# Patient Record
Sex: FEMALE | Race: BLACK OR AFRICAN AMERICAN | Employment: UNEMPLOYED | URBAN - METROPOLITAN AREA
[De-identification: names, ages, dates, MRNs, and addresses within clinical notes are randomized per-mention and may not be internally consistent; named-entity substitution may affect disease eponyms.]

---

## 2017-08-31 ENCOUNTER — HOSPITAL ENCOUNTER (EMERGENCY)
Age: 22
Discharge: HOME | End: 2017-08-31
Attending: INTERNAL MEDICINE
Payer: COMMERCIAL

## 2017-08-31 VITALS
DIASTOLIC BLOOD PRESSURE: 85 MMHG | RESPIRATION RATE: 16 BRPM | OXYGEN SATURATION: 98 % | HEIGHT: 60 IN | WEIGHT: 115 LBS | SYSTOLIC BLOOD PRESSURE: 149 MMHG | BODY MASS INDEX: 22.58 KG/M2 | TEMPERATURE: 98.9 F | HEART RATE: 102 BPM

## 2017-08-31 DIAGNOSIS — R10.9 ABDOMINAL PAIN, OTHER SPECIFIED SITE: Primary | ICD-10-CM

## 2017-08-31 LAB
APPEARANCE UR: CLEAR
BACTERIA URNS QL MICRO: NEGATIVE /HPF
BILIRUB UR QL: NEGATIVE
CLUE CELLS VAG QL WET PREP: NORMAL
COLOR UR: ABNORMAL
EPITH CASTS URNS QL MICRO: ABNORMAL /LPF
GLUCOSE UR STRIP.AUTO-MCNC: NEGATIVE MG/DL
HGB UR QL STRIP: NEGATIVE
KETONES UR QL STRIP.AUTO: NEGATIVE MG/DL
KOH PREP SPEC: NORMAL
LEUKOCYTE ESTERASE UR QL STRIP.AUTO: NEGATIVE
MUCOUS THREADS URNS QL MICRO: ABNORMAL /LPF
NITRITE UR QL STRIP.AUTO: NEGATIVE
PH UR STRIP: 7.5 [PH] (ref 5–8)
PROT UR STRIP-MCNC: NEGATIVE MG/DL
RBC #/AREA URNS HPF: ABNORMAL /HPF (ref 0–5)
SERVICE CMNT-IMP: NORMAL
SP GR UR REFRACTOMETRY: 1.02 (ref 1–1.03)
T VAGINALIS VAG QL WET PREP: NORMAL
UROBILINOGEN UR QL STRIP.AUTO: 1 EU/DL (ref 0.2–1)
WBC URNS QL MICRO: ABNORMAL /HPF (ref 0–4)

## 2017-08-31 PROCEDURE — 87210 SMEAR WET MOUNT SALINE/INK: CPT | Performed by: INTERNAL MEDICINE

## 2017-08-31 PROCEDURE — 87491 CHLMYD TRACH DNA AMP PROBE: CPT | Performed by: INTERNAL MEDICINE

## 2017-08-31 PROCEDURE — 99283 EMERGENCY DEPT VISIT LOW MDM: CPT

## 2017-08-31 PROCEDURE — 81001 URINALYSIS AUTO W/SCOPE: CPT | Performed by: INTERNAL MEDICINE
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
## 2017-09-01 LAB
C TRACH DNA SPEC QL NAA+PROBE: NEGATIVE
N GONORRHOEA DNA SPEC QL NAA+PROBE: NEGATIVE
SAMPLE TYPE: NORMAL
SERVICE CMNT-IMP: NORMAL
SPECIMEN SOURCE: NORMAL

## 2017-09-01 NOTE — ED NOTES
RN went into room to administer Tdap and to provide wound care. Patient was not in room. Notified provider Stew Cuadra MD. Provider verbalized understanding of this.

## 2017-09-01 NOTE — ED NOTES
Patient wanted to know what results of pregnancy test was. Informed patient that one line is indicative of a negative pregnancy. Patient verbalized understanding of information.

## 2017-09-01 NOTE — ED NOTES
Emergency Department Nursing Plan of Care       The Nursing Plan of Care is developed from the Nursing assessment and Emergency Department Attending provider initial evaluation. The plan of care may be reviewed in the ED Provider note.     The Plan of Care was developed with the following considerations:   Patient / Family readiness to learn indicated by:verbalized understanding  Persons(s) to be included in education: patient  Barriers to Learning/Limitations:No    [address], RN    8/31/2017   7:45 PM    See Assessment

## 2017-09-01 NOTE — ED NOTES
Patient wants to know how much longer will it be before the doctor sees her again. Reports that she \"has to go to work. \" Notified provider Tom Haywood. Provider verbalized understanding of information.

## 2017-12-12 ENCOUNTER — OFFICE VISIT (OUTPATIENT)
Dept: BEHAVIORAL/MENTAL HEALTH CLINIC | Age: 22
End: 2017-12-12

## 2017-12-12 VITALS
BODY MASS INDEX: 24.74 KG/M2 | WEIGHT: 126 LBS | OXYGEN SATURATION: 98 % | HEART RATE: 92 BPM | SYSTOLIC BLOOD PRESSURE: 102 MMHG | HEIGHT: 60 IN | DIASTOLIC BLOOD PRESSURE: 67 MMHG

## 2017-12-12 DIAGNOSIS — F41.9 ANXIETY: ICD-10-CM

## 2017-12-12 DIAGNOSIS — F60.3 BORDERLINE PERSONALITY DISORDER (HCC): Primary | ICD-10-CM

## 2017-12-12 DIAGNOSIS — F40.10 SOCIAL ANXIETY DISORDER: ICD-10-CM

## 2017-12-12 DIAGNOSIS — R45.4 EXCESSIVE ANGER: ICD-10-CM

## 2017-12-12 RX ORDER — HYDROXYZINE 25 MG/1
25 TABLET, FILM COATED ORAL
Qty: 90 TAB | Refills: 0 | Status: SHIPPED | OUTPATIENT
Start: 2017-12-12 | End: 2018-08-14

## 2017-12-12 RX ORDER — DIVALPROEX SODIUM 250 MG/1
250 TABLET, DELAYED RELEASE ORAL 2 TIMES DAILY
Qty: 60 TAB | Refills: 0 | Status: SHIPPED | OUTPATIENT
Start: 2017-12-12
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
## 2017-12-12 NOTE — PATIENT INSTRUCTIONS
Sleep Tips    What to avoid    · Do not have drinks with caffeine, such as coffee or black tea, for 8 hours before bed. · Do not smoke or use other types of tobacco near bedtime. Nicotine is a stimulant and can keep you awake. · Avoid drinking alcohol late in the evening, because it can cause you to wake in the middle of the night. · Do not eat a big meal close to bedtime. If you are hungry, eat a light snack. · Do not drink a lot of water close to bedtime, because the need to urinate may wake you up during the night. · Do not read or watch TV in bed. Use the bed only for sleeping and sexual activity. What to try    · Go to bed at the same time every night, and wake up at the same time every morning. Do not take naps during the day. · Keep your bedroom quiet, dark, and cool. · Get regular exercise, but not within 3 to 4 hours of your bedtime. · Sleep on a comfortable pillow and mattress. · If watching the clock makes you anxious, turn it facing away from you so you cannot see the time. · If you worry when you lie down, start a worry book. Well before bedtime, write down your worries, and then set the book and your concerns aside. · Try meditation or other relaxation techniques before you go to bed. · If you cannot fall asleep, get up and go to another room until you feel sleepy. Do something relaxing. Repeat your bedtime routine before you go to bed again. Make your house quiet and calm about an hour before bedtime. Turn down the lights, turn off the TV, log off the computer, and turn down the volume on music. This can help you relax after a busy day. Learning About Borderline Personality Disorder  What is borderline personality disorder? Borderline personality disorder is a mental health condition. It causes intense mood swings, impulsive behaviors, and severe problems with self-worth. It can lead to troubled relationships in every area of your life.   Experts don't know exactly what causes the disorder. It may be linked to a problem with the parts of the brain that control reactions to emotions. The disorder also seems to run in families. Often, people who get it faced some kind of childhood trauma such as abuse, neglect, or the death of a parent. Most of the time, signs of the disorder first appear in childhood. But problems often don't start until the early adult years. It's important to know that the disorder can be treated. Treatment can be hard, and getting better can take years. But with treatment, most people with borderline personality disorder do get better over time. What are the symptoms? Everyone has problems with emotions or behaviors sometimes. But if you have borderline personality disorder, the problems are severe. They repeat over a long time and disrupt your life. The most common symptoms include:  Intense emotions and mood swings. Harmful, impulsive behaviors. These may include substance abuse, binge eating, out-of-control spending, risky sexual behavior, and reckless driving. Hurting yourself. This may include cutting or burning yourself or attempting suicide. Trouble with relationships. You may see others as either \"good\" or \"bad. \" And your view may suddenly shift from one to the other, for minor reasons. Feeling worthless or empty inside. A frantic fear of being left alone (abandoned). This fear may lead to desperate attempts to hold on to those around you. Or it may cause you to reject others before they can reject you. Problems with anger. These may include violent temper tantrums and aggressive behavior. How is it treated? It may seem that there is no one on your side. You might have been told that there is no hope. But just because you've heard this, that doesn't mean it's true. Getting medical treatment and taking care of yourself can really help. Medical treatment  When you start treatment, you will find health professionals who will help you.  You will also meet others who have gone through what you are going through. Long-term treatment can reduce symptoms and harmful behaviors. It can also help you manage your emotions better. Treatment may include:  Counseling and therapy. It's important to find a counselor you can build a stable relationship with. This can be hard. Your condition may cause you to see your counselor as caring one minute and cruel the next. This can happen especially when he or she asks you to try to change a behavior. Try to find a counselor who has special training in dialectical behavioral therapy. It's a type of therapy that is often used to treat people with this disorder. Medicines. Examples are antidepressants, mood stabilizers, and antipsychotics. They may be helpful when you use them along with therapy. Self-care  There are things you can do to help yourself. Here are some tips:  Keep a regular daily schedule. Do not drink alcohol or use drugs. If your doctor prescribed medicines for you, take them exactly as directed. Get a healthy amount of sleep. Try to be active during daylight hours, and go to sleep and wake up at the same time each day. Eat healthy foods like fruits and vegetables; whole-grain breads and cereals; and lean meats, fish, and poultry. Practice mindfulness or other meditation. To be mindful means to pay attention to and accept the things that are happening right now, in the present moment. Keep to your treatment plan, even when it's hard. Keep the numbers for these national suicide hotlines: 6-462-922-TALK (8-910.548.5136) and 3-659-FTQYVTV (0-292.518.2218). If you or someone you know talks about suicide or about feeling hopeless, get help right away. When should you call for help? Call 911 anytime you think you may need emergency care. For example, call if:  You feel you cannot stop from hurting yourself or someone else. Call your doctor now or seek immediate medical care if:  You feel much more depressed.   You hear voices. Watch closely for changes in your health, and be sure to contact your doctor if:  You have a new crisis you can't handle. Follow-up care is a key part of your treatment and safety. Be sure to make and go to all appointments, and call your doctor if you are having problems. It's also a good idea to know your test results and keep a list of the medicines you take. Where can you learn more? Go to http://ruby-duane.info/. Enter B255 in the search box to learn more about \"Learning About Borderline Personality Disorder. \"  Current as of: May 12, 2017  Content Version: 11.4  © 3912-3043 [facility]. Care instructions adapted under license by [employer] (which disclaims liability or warranty for this information). If you have questions about a medical condition or this instruction, always ask your Dialectical Behavior Therapy (DBT)    [facility]  Jaz Chris, [facility]  [phone]  [email or web address]    Therapist:  Andrey Sheppard      [facility]  (Dr Lalita Diaz, Dr Alverto Daniel)  [address]. [employer] disclaims any warranty or liability for your use of this information.   ·

## 2017-12-12 NOTE — MR AVS SNAPSHOT
[address] [place] [address] 
[phone] [facility] MRN: [id number] :1995 Visit Information Date & Time Provider Department Dept. Phone Encounter #  
 2017  3:00 PM Ivon Araujo [facility] [phone] [id number] Follow-up Instructions Return in about 4 weeks (around 2018) for med check and follow up. Follow-up and Disposition History Upcoming Health Maintenance Date Due Hepatitis A Peds Age 1-18 (1 of 2 - Standard Series) 1998 DTaP/Tdap/Td series (1 - Tdap) 2004 HPV Age 9Y-34Y (1 of 3 - Female 3 Dose Series) 2008 Influenza Age 5 to Adult 2018 Allergies as of 2017  Review Complete On: 2017 By: Ivon Araujo NP No Known Allergies Current Immunizations  Never Reviewed No immunizations on file. Not reviewed this visit You Were Diagnosed With   
  
 Codes Comments Borderline personality disorder    -  Primary ICD-10-CM: F60.3 ICD-9-CM: 420.91 Anxiety     ICD-10-CM: F41.9 ICD-9-CM: 300.00 Excessive anger     ICD-10-CM: R45.4 ICD-9-CM: 312.00 Social anxiety disorder     ICD-10-CM: F40.10 ICD-9-CM: 300.23 Vitals BP Pulse Height(growth percentile) Weight(growth percentile) 102/67 (38 %/ 71 %)* (BP 1 Location: Left arm, BP Patient Position: Sitting) 92 5' (1.524 m) (5 %, Z= -1.68) 126 lb (57.2 kg) (46 %, Z= -0.11) SpO2 BMI OB Status Smoking Status 98% 24.61 kg/m2 (76 %, Z= 0.71) Having regular periods Unknown If Ever Smoked *BP percentiles are based on NHBPEP's 4th Report Growth percentiles are based on CDC 2-20 Years data. Vitals History BMI and BSA Data Body Mass Index Body Surface Area  
 24.61 kg/m 2 1.56 m 2 Preferred Pharmacy Pharmacy Name Phone  [phone] [facility]. [phone] Your Updated Medication List  
  
   
This list is accurate as of 12/12/17 11:59 PM.  Always use your most recent med list.  
  
  
  
  
 divalproex  mg tablet Commonly known as:  DEPAKOTE Take 1 Tab by mouth two (2) times a day.  
  
 hydrOXYzine HCl 25 mg tablet Commonly known as:  ATARAX Take 1 Tab by mouth three (3) times daily as needed for Anxiety. Prescriptions Sent to Pharmacy Refills  
 divalproex DR (DEPAKOTE) 250 mg tablet 0 Sig: Take 1 Tab by mouth two (2) times a day. Class: Normal  
 Pharmacy: [address] [place] [address] #: [phone] Route: Oral  
 hydrOXYzine HCl (ATARAX) 25 mg tablet 0 Sig: Take 1 Tab by mouth three (3) times daily as needed for Anxiety. Class: Normal  
 Pharmacy: [address] [place] [address] #: [phone] Route: Oral  
  
Follow-up Instructions Return in about 4 weeks (around 1/9/2018) for med check and follow up. To-Do List   
 12/12/2017 Lab:  VITAMIN D, 25 HYDROXY Patient Instructions Sleep Tips What to avoid   
· Do not have drinks with caffeine, such as coffee or black tea, for 8 hours before bed. · Do not smoke or use other types of tobacco near bedtime. Nicotine is a stimulant and can keep you awake. · Avoid drinking alcohol late in the evening, because it can cause you to wake in the middle of the night. · Do not eat a big meal close to bedtime. If you are hungry, eat a light snack. · Do not drink a lot of water close to bedtime, because the need to urinate may wake you up during the night. · Do not read or watch TV in bed. Use the bed only for sleeping and sexual activity. What to try   
· Go to bed at the same time every night, and wake up at the same time every morning. Do not take naps during the day. · Keep your bedroom quiet, dark, and cool. · Get regular exercise, but not within 3 to 4 hours of your bedtime. · Sleep on a comfortable pillow and mattress. · If watching the clock makes you anxious, turn it facing away from you so you cannot see the time. · If you worry when you lie down, start a worry book. Well before bedtime, write down your worries, and then set the book and your concerns aside. · Try meditation or other relaxation techniques before you go to bed. · If you cannot fall asleep, get up and go to another room until you feel sleepy. Do something relaxing. Repeat your bedtime routine before you go to bed again. Make your house quiet and calm about an hour before bedtime. Turn down the lights, turn off the TV, log off the computer, and turn down the volume on music. This can help you relax after a busy day. Learning About Borderline Personality Disorder What is borderline personality disorder? Borderline personality disorder is a mental health condition. It causes intense mood swings, impulsive behaviors, and severe problems with self-worth. It can lead to troubled relationships in every area of your life. Experts don't know exactly what causes the disorder. It may be linked to a problem with the parts of the brain that control reactions to emotions. The disorder also seems to run in families. Often, people who get it faced some kind of childhood trauma such as abuse, neglect, or the death of a parent. Most of the time, signs of the disorder first appear in childhood. But problems often don't start until the early adult years. It's important to know that the disorder can be treated. Treatment can be hard, and getting better can take years. But with treatment, most people with borderline personality disorder do get better over time. What are the symptoms? Everyone has problems with emotions or behaviors sometimes. But if you have borderline personality disorder, the problems are severe.  They repeat over a long time and disrupt your life. The most common symptoms include: 
Intense emotions and mood swings. Harmful, impulsive behaviors. These may include substance abuse, binge eating, out-of-control spending, risky sexual behavior, and reckless driving. Hurting yourself. This may include cutting or burning yourself or attempting suicide. Trouble with relationships. You may see others as either \"good\" or \"bad. \" And your view may suddenly shift from one to the other, for minor reasons. Feeling worthless or empty inside. A frantic fear of being left alone (abandoned). This fear may lead to desperate attempts to hold on to those around you. Or it may cause you to reject others before they can reject you. Problems with anger. These may include violent temper tantrums and aggressive behavior. How is it treated? It may seem that there is no one on your side. You might have been told that there is no hope. But just because you've heard this, that doesn't mean it's true. Getting medical treatment and taking care of yourself can really help. Medical treatment When you start treatment, you will find health professionals who will help you. You will also meet others who have gone through what you are going through. Long-term treatment can reduce symptoms and harmful behaviors. It can also help you manage your emotions better. Treatment may include: 
Counseling and therapy. It's important to find a counselor you can build a stable relationship with. This can be hard. Your condition may cause you to see your counselor as caring one minute and cruel the next. This can happen especially when he or she asks you to try to change a behavior. Try to find a counselor who has special training in dialectical behavioral therapy. It's a type of therapy that is often used to treat people with this disorder. Medicines.  Examples are antidepressants, mood stabilizers, and antipsychotics. They may be helpful when you use them along with therapy. Self-care There are things you can do to help yourself. Here are some tips: 
Keep a regular daily schedule. Do not drink alcohol or use drugs. If your doctor prescribed medicines for you, take them exactly as directed. Get a healthy amount of sleep. Try to be active during daylight hours, and go to sleep and wake up at the same time each day. Eat healthy foods like fruits and vegetables; whole-grain breads and cereals; and lean meats, fish, and poultry. Practice mindfulness or other meditation. To be mindful means to pay attention to and accept the things that are happening right now, in the present moment. Keep to your treatment plan, even when it's hard. Keep the numbers for these national suicide hotlines: 1-844-720-TALK (9-459.438.8492) and 4-760-EMLLWKN (7-174.298.1794). If you or someone you know talks about suicide or about feeling hopeless, get help right away. When should you call for help? Call 911 anytime you think you may need emergency care. For example, call if: You feel you cannot stop from hurting yourself or someone else. Call your doctor now or seek immediate medical care if: You feel much more depressed. You hear voices. Watch closely for changes in your health, and be sure to contact your doctor if: 
You have a new crisis you can't handle. Follow-up care is a key part of your treatment and safety. Be sure to make and go to all appointments, and call your doctor if you are having problems. It's also a good idea to know your test results and keep a list of the medicines you take. Where can you learn more? Go to http://ruby-duane.info/. Enter R187 in the search box to learn more about \"Learning About Borderline Personality Disorder. \" Current as of: May 12, 2017 Content Version: 11.4 © 8606-4751 Healthwise, Incorporated.  Care instructions adapted under license by [address] (which disclaims liability or warranty for this information). If you have questions about a medical condition or this instruction, always ask your Dialectical Behavior Therapy (DBT) [facility] [phone] [facility], [address] [facility] 
[phone] 
[email or web address]://www.[employer]/ Therapist:  Jacinto Briceño [facility] (Dr Edu Cox, Dr Rose Mary Mar) [address] [facility] professional. [employer] Varela disclaims any warranty or liability for your use of this information. ·  
 
 
 Patient Instructions History Introducing [address]! Halle Caicedo introduces [employer] (TEN) patient portal. Now you can access parts of your medical record, email your doctor's office, and request medication refills online. 1. In your internet browser, go to [email or web address] ([employer].). [employer]/TMMI ([employer]MM [employer].) 2. Click on the First Time User? Click Here link in the Sign In box. You will see the New Member Sign Up page. 3. Enter your [employer] Network (TEN) Access Code exactly as it appears below. You will not need to use this code after youve completed the sign-up process. If you do not sign up before the expiration date, you must request a new code. · [employer] (TEN) Access Code: [email or web address] Expires: 9/24/2018  9:05 AM 
 
4. Enter the last four digits of your Social Security Number (xxxx) and Date of Birth (mm/dd/yyyy) as indicated and click Submit. You will be taken to the next sign-up page. 5. Create a TherapeuticsMDt ID. This will be your [employer] E[employer] Network (TEN) login ID and cannot be changed, so think of one that is secure and easy to remember. 6. Create a TherapeuticsMDt password. You can change your password at any time. 7. Enter your Password Reset Question and Answer. This can be used at a later time if you forget your password. 8. Enter your e-mail address. You will receive e-mail notification when new information is available in [address] 9. Click Sign Up. You can now view and download portions of your medical record. 10. Click the Download Summary menu link to download a portable copy of your medical information. If you have questions, please visit the Frequently Asked Questions section of the Globa.li website. Remember, Globa.li is NOT to be used for urgent needs. For medical emergencies, dial 911. Now available from your iPhone and Android! Please provide this summary of care documentation to your next provider. Your primary care clinician is listed as Phys Other. If you have any questions after today's visit, please call [phone].

## 2018-08-14 ENCOUNTER — OFFICE VISIT (OUTPATIENT)
Dept: BEHAVIORAL/MENTAL HEALTH CLINIC | Age: 23
End: 2018-08-14

## 2018-08-14 VITALS
HEIGHT: 60 IN | OXYGEN SATURATION: 98 % | RESPIRATION RATE: 19 BRPM | HEART RATE: 78 BPM | BODY MASS INDEX: 25.45 KG/M2 | SYSTOLIC BLOOD PRESSURE: 93 MMHG | WEIGHT: 129.6 LBS | DIASTOLIC BLOOD PRESSURE: 63 MMHG

## 2018-08-14 DIAGNOSIS — F60.3 BORDERLINE PERSONALITY DISORDER (HCC): ICD-10-CM

## 2018-08-14 DIAGNOSIS — F40.10 SOCIAL ANXIETY DISORDER: ICD-10-CM

## 2018-08-14 DIAGNOSIS — F39 MOOD DISORDER (HCC): ICD-10-CM

## 2018-08-14 DIAGNOSIS — F41.9 ANXIETY: ICD-10-CM

## 2018-08-14 DIAGNOSIS — F33.1 DEPRESSION, MAJOR, RECURRENT, MODERATE (HCC): Primary | ICD-10-CM

## 2018-08-14 RX ORDER — BUSPIRONE HYDROCHLORIDE 7.5 MG/1
7.5 TABLET ORAL 2 TIMES DAILY
Qty: 60 TAB | Refills: 0 | Status: SHIPPED | OUTPATIENT
Start: 2018-08-14

## 2018-08-14 RX ORDER — PAROXETINE HYDROCHLORIDE 20 MG/1
TABLET, FILM COATED ORAL
Qty: 30 TAB | Refills: 0 | Status: SHIPPED | OUTPATIENT
Start: 2018-08-14

## 2018-08-14 NOTE — MR AVS SNAPSHOT
[address] [place] [address] [address] [facility] 
[phone] Patient: [facility] MRN: [id number] :1995 Visit Information Date & Time Provider Department Dept. Phone Encounter #  
 2018  9:00 AM Sunday Salazar, [facility] [phone] [id number] Follow-up Instructions Return in about 4 weeks (around 2018) for med check and follow up. Upcoming Health Maintenance Date Due Hepatitis A Peds Age 1-18 (1 of 2 - Standard Series) 1998 DTaP/Tdap/Td series (1 - Tdap) 2004 HPV Age 9Y-34Y (1 of 3 - Female 3 Dose Series) 2008 Influenza Age 5 to Adult 2018 Allergies as of 2018  Review Complete On: 2018 By: Sunday Salazar NP No Known Allergies Current Immunizations  Never Reviewed No immunizations on file. Not reviewed this visit You Were Diagnosed With   
  
 Codes Comments Depression, major, recurrent, moderate ([facility] [address]    -  Primary ICD-10-CM: F33.1 ICD-9-CM: 296.32 Mood disorder ([facility] [address]     ICD-10-CM: F39 
ICD-9-CM: 296.90 Anxiety     ICD-10-CM: F41.9 ICD-9-CM: 300.00 Social anxiety disorder     ICD-10-CM: F40.10 ICD-9-CM: 300.23 Borderline personality disorder     ICD-10-CM: F60.3 ICD-9-CM: 600.86 Vitals BP Pulse Resp Height(growth percentile) Weight(growth percentile) SpO2  
 93/63 (BP 1 Location: Left arm, BP Patient Position: Sitting) 78 19 5' (1.524 m) 129 lb 9.6 oz (58.8 kg) 98% BMI OB Status Smoking Status 25.31 kg/m2 Having regular periods Unknown If Ever Smoked Vitals History BMI and BSA Data Body Mass Index Body Surface Area  
 25.31 kg/m 2 1.58 m 2 Preferred Pharmacy Pharmacy Name Phone [facility] [address] [place] [address] [phone] Your Updated Medication List  
  
   
 This list is accurate as of 8/14/18  9:51 AM.  Always use your most recent med list.  
  
  
  
  
 busPIRone 7.5 mg tablet Commonly known as:  BUSPAR Take 1 Tab by mouth two (2) times a day. divalproex  mg tablet Commonly known as:  DEPAKOTE Take 1 Tab by mouth two (2) times a day. PARoxetine 20 mg tablet Commonly known as:  PAXIL Please take half tablet daily for 7 days and then take 1 tablet daily Prescriptions Sent to Pharmacy Refills PARoxetine (PAXIL) 20 mg tablet 0 Sig: Please take half tablet daily for 7 days and then take 1 tablet daily Class: Normal  
 Pharmacy: [address] #: [phone]  
 busPIRone (BUSPAR) 7.5 mg tablet 0 Sig: Take 1 Tab by mouth two (2) times a day. Class: Normal  
 Pharmacy: [address] [place] [address] Ph #: [phone] Route: Oral  
  
We Performed the Following CBC WITH AUTOMATED DIFF [02355 CPT(R)] DRUG ABUSE PROF, URINE (SEVEN DRUGS), MS COFIRM [id number] CPT(R)] METABOLIC PANEL, COMPREHENSIVE [19040 CPT(R)] TSH 3RD GENERATION [03512 CPT(R)] Follow-up Instructions Return in about 4 weeks (around 9/11/2018) for med check and follow up. Patient Instructions Sleep Tips What to avoid   
· Do not have drinks with caffeine, such as coffee or black tea, for 8 hours before bed. · Do not smoke or use other types of tobacco near bedtime. Nicotine is a stimulant and can keep you awake. · Avoid drinking alcohol late in the evening, because it can cause you to wake in the middle of the night. · Do not eat a big meal close to bedtime. If you are hungry, eat a light snack. · Do not drink a lot of water close to bedtime, because the need to urinate may wake you up during the night. · Do not read or watch TV in bed. Use the bed only for sleeping and sexual activity.  
What to try   
 · Go to bed at the same time every night, and wake up at the same time every morning. Do not take naps during the day. · Keep your bedroom quiet, dark, and cool. · Get regular exercise, but not within 3 to 4 hours of your bedtime. · Sleep on a comfortable pillow and mattress. · If watching the clock makes you anxious, turn it facing away from you so you cannot see the time. · If you worry when you lie down, start a worry book. Well before bedtime, write down your worries, and then set the book and your concerns aside. · Try meditation or other relaxation techniques before you go to bed. · If you cannot fall asleep, get up and go to another room until you feel sleepy. Do something relaxing. Repeat your bedtime routine before you go to bed again. Make your house quiet and calm about an hour before bedtime. Turn down the lights, turn off the TV, log off the computer, and turn down the volume on music. This can help you relax after a busy day. Depression and Chronic Disease: Care Instructions Your Care Instructions A chronic disease is one that you have for a long time. Some chronic diseases can be controlled, but they usually cannot be cured. Depression is common in people with chronic diseases, but it often goes unnoticed. Many people have concerns about seeking treatment for a mental health problem. You may think it's a sign of weakness, or you don't want people to know about it. It's important to overcome these reasons for not seeking treatment. Treating depression or anxiety is good for your health. Follow-up care is a key part of your treatment and safety. Be sure to make and go to all appointments, and call your doctor if you are having problems. It's also a good idea to know your test results and keep a list of the medicines you take. How can you care for yourself at home? Watch for symptoms of depression The symptoms of depression are often subtle at first. You may think they are caused by your disease rather than depression. Or you may think it is normal to be depressed when you have a chronic disease. If you are depressed you may: 
Feel sad or hopeless. Feel guilty or worthless. Not enjoy the things you used to enjoy. Feel hopeless, as though life is not worth living. Have trouble thinking or remembering. Have low energy, and you may not eat or sleep well. Pull away from others. Think often about death or killing yourself. (Keep the numbers for these national suicide hotlines: 1-616-627-TALK [1-621.349.3378] and 5-460-SVVTGLW [1-239.148.4898]. ) Get treatment By treating your depression, you can feel more hopeful and have more energy. If you feel better, you may take better care of yourself, so your health may improve. Talk to your doctor if you have any changes in mood during treatment for your disease. Ask your doctor for help. Counseling, antidepressant medicine, or a combination of the two can help most people with depression. Often a combination works best. Counseling can also help you cope with having a chronic disease. When should you call for help? Call 911 anytime you think you may need emergency care. For example, call if: 
  You feel like hurting yourself or someone else.  
  Someone you know has depression and is about to attempt or is attempting suicide.  
 Call your doctor now or seek immediate medical care if: 
  You hear voices.  
  Someone you know has depression and: 
Starts to give away his or her possessions. Uses illegal drugs or drinks alcohol heavily. Talks or writes about death, including writing suicide notes or talking about guns, knives, or pills. Starts to spend a lot of time alone. Acts very aggressively or suddenly appears calm.  
 Watch closely for changes in your health, and be sure to contact your doctor if: 
  You do not get better as expected. Where can you learn more? Go to http://ruby-duane.info/. Enter I355 in the search box to learn more about \"Depression and Chronic Disease: Care Instructions. \" Current as of: December 7, 2017 Content Version: 11.7 © 7234-8267 Auris Medical, Incorporated. Care instructions adapted under license by [employer] (which disclaims liability or warranty for this information). If you have questions about a medical condition or this instruction, always ask your healthcare professional. [facility] [address] any warranty or liability for your use of this information. · Introducing [facility]! [facility] introduces [employer] patient portal. Now you can access parts of your medical record, email your doctor's office, and request medication refills online. 1. In your internet browser, go to https://Your Dollar [email or web address]. [email or web address]/Your Dollar Matters 2. Click on the First Time User? Click Here link in the Sign In box. You will see the New Member Sign Up page. 3. Enter your [employer] Access Code exactly as it appears below. You will not need to use this code after youve completed the sign-up process. If you do not sign up before the expiration date, you must request a new code. · [employer] Access Code: [email or web address] Expires: 9/24/2018  9:05 AM 
 
4. Enter the last four digits of your Social Security Number (xxxx) and Date of Birth (mm/dd/yyyy) as indicated and click Submit. You will be taken to the next sign-up page. 5. Create a [employer] ID. This will be your [employer] login ID and cannot be changed, so think of one that is secure and easy to remember. 6. Create a [employer] password. You can change your password at any time. 7. Enter your Password Reset Question and Answer. This can be used at a later time if you forget your password. 8. Enter your e-mail address. You will receive e-mail notification when new information is available in [address] 9. Click Sign Up. You can now view and download portions of your medical record. 10. Click the Download Summary menu link to download a portable copy of your medical information. If you have questions, please visit the Frequently Asked Questions section of the [employer] website. Remember, [employer] is NOT to be used for urgent needs. For medical emergencies, dial 911. Now available from your iPhone and Android! Please provide this summary of care documentation to your next provider. Your primary care clinician is listed as Phys Other. If you have any questions after today's visit, please call [phone].

## 2018-08-14 NOTE — PROGRESS NOTES
Health Maintenance Due   Topic Date Due    Hepatitis A Peds Age 1-18 (1 of 2 - Standard Series) 12/29/1998    DTaP/Tdap/Td series (1 - Tdap) 12/29/2004    HPV Age 9Y-34Y (3 of 3 - Female 3 Dose Series) 12/29/2008    Influenza Age 5 to Adult  08/01/2018       Chief Complaint   Patient presents with   [address] Problem       1. Have you been to the ER, urgent care clinic since your last visit? Hospitalized since your last visit? No    2. Have you seen or consulted any other health care providers outside of the [address] [place] [address] since your last visit? Include any pap smears or colon screening. No    3) Do you have an Advance Directive on file? no    4) Are you interested in receiving information on Advance Directives? NO      Patient is accompanied by self I have received verbal consent from [facility] to discuss any/all medical information while they are present in the room.

## 2018-08-14 NOTE — PATIENT INSTRUCTIONS
Sleep Tips    What to avoid    · Do not have drinks with caffeine, such as coffee or black tea, for 8 hours before bed. · Do not smoke or use other types of tobacco near bedtime. Nicotine is a stimulant and can keep you awake. · Avoid drinking alcohol late in the evening, because it can cause you to wake in the middle of the night. · Do not eat a big meal close to bedtime. If you are hungry, eat a light snack. · Do not drink a lot of water close to bedtime, because the need to urinate may wake you up during the night. · Do not read or watch TV in bed. Use the bed only for sleeping and sexual activity. What to try    · Go to bed at the same time every night, and wake up at the same time every morning. Do not take naps during the day. · Keep your bedroom quiet, dark, and cool. · Get regular exercise, but not within 3 to 4 hours of your bedtime. · Sleep on a comfortable pillow and mattress. · If watching the clock makes you anxious, turn it facing away from you so you cannot see the time. · If you worry when you lie down, start a worry book. Well before bedtime, write down your worries, and then set the book and your concerns aside. · Try meditation or other relaxation techniques before you go to bed. · If you cannot fall asleep, get up and go to another room until you feel sleepy. Do something relaxing. Repeat your bedtime routine before you go to bed again. Make your house quiet and calm about an hour before bedtime. Turn down the lights, turn off the TV, log off the computer, and turn down the volume on music. This can help you relax after a busy day. Depression and Chronic Disease: Care Instructions  Your Care Instructions    A chronic disease is one that you have for a long time. Some chronic diseases can be controlled, but they usually cannot be cured. Depression is common in people with chronic diseases, but it often goes unnoticed.   Many people have concerns about seeking treatment for a mental health problem. You may think it's a sign of weakness, or you don't want people to know about it. It's important to overcome these reasons for not seeking treatment. Treating depression or anxiety is good for your health. Follow-up care is a key part of your treatment and safety. Be sure to make and go to all appointments, and call your doctor if you are having problems. It's also a good idea to know your test results and keep a list of the medicines you take. How can you care for yourself at home? Watch for symptoms of depression  The symptoms of depression are often subtle at first. You may think they are caused by your disease rather than depression. Or you may think it is normal to be depressed when you have a chronic disease. If you are depressed you may:  Feel sad or hopeless. Feel guilty or worthless. Not enjoy the things you used to enjoy. Feel hopeless, as though life is not worth living. Have trouble thinking or remembering. Have low energy, and you may not eat or sleep well. Pull away from others. Think often about death or killing yourself. (Keep the numbers for these national suicide hotlines: 4-206-783-TALK [1-606.842.9945] and 2-687-XSATLME [1-543.304.1450]. )  Get treatment  By treating your depression, you can feel more hopeful and have more energy. If you feel better, you may take better care of yourself, so your health may improve. Talk to your doctor if you have any changes in mood during treatment for your disease. Ask your doctor for help. Counseling, antidepressant medicine, or a combination of the two can help most people with depression. Often a combination works best. Counseling can also help you cope with having a chronic disease. When should you call for help? Call 911 anytime you think you may need emergency care.  For example, call if:    You feel like hurting yourself or someone else.     Someone you know has depression and is about to attempt or is attempting suicide.    Call your doctor now or seek immediate medical care if:    You hear voices.     Someone you know has depression and:  Starts to give away his or her possessions. Uses illegal drugs or drinks alcohol heavily. Talks or writes about death, including writing suicide notes or talking about guns, knives, or pills. Starts to spend a lot of time alone. Acts very aggressively or suddenly appears calm.    Watch closely for changes in your health, and be sure to contact your doctor if:    You do not get better as expected. Where can you learn more? Go to http://ruby-duane.info/. Enter C351 in the search box to learn more about \"Depression and Chronic Disease: Care Instructions. \"  Current as of: December 7, 2017  Content Version: 11.7  © 1064-3000 [employer]. Care instructions adapted under license by [employer] (which disclaims liability or warranty for this information). If you have questions about a medical condition or this instruction, always ask your healthcare professional. [place] [address] any warranty or liability for your use of this information.   ·
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
## 2018-08-20 LAB
ALBUMIN SERPL-MCNC: 4.7 G/DL (ref 3.5–5.5)
ALBUMIN/GLOB SERPL: 1.8 {RATIO} (ref 1.2–2.2)
ALP SERPL-CCNC: 69 IU/L (ref 39–117)
ALT SERPL-CCNC: 8 IU/L (ref 0–32)
AMPHETAMINES UR QL SCN: NEGATIVE NG/ML
AST SERPL-CCNC: 19 IU/L (ref 0–40)
BARBITURATES UR QL SCN: NEGATIVE NG/ML
BASOPHILS # BLD AUTO: 0 X10E3/UL (ref 0–0.2)
BASOPHILS NFR BLD AUTO: 1 %
BENZODIAZ UR QL: POSITIVE
BILIRUB SERPL-MCNC: 0.4 MG/DL (ref 0–1.2)
BUN SERPL-MCNC: 13 MG/DL (ref 6–20)
BUN/CREAT SERPL: 18 (ref 9–23)
BZE UR QL: NEGATIVE NG/ML
CALCIUM SERPL-MCNC: 9.4 MG/DL (ref 8.7–10.2)
CANNABINOIDS UR QL SCN: POSITIVE
CHLORIDE SERPL-SCNC: 101 MMOL/L (ref 96–106)
CO2 SERPL-SCNC: 26 MMOL/L (ref 20–29)
CREAT SERPL-MCNC: 0.74 MG/DL (ref 0.57–1)
EOSINOPHIL # BLD AUTO: 0.2 X10E3/UL (ref 0–0.4)
EOSINOPHIL NFR BLD AUTO: 3 %
ERYTHROCYTE [DISTWIDTH] IN BLOOD BY AUTOMATED COUNT: 13.8 % (ref 12.3–15.4)
GLOBULIN SER CALC-MCNC: 2.6 G/DL (ref 1.5–4.5)
GLUCOSE SERPL-MCNC: 70 MG/DL (ref 65–99)
HCT VFR BLD AUTO: 36.7 % (ref 34–46.6)
HGB BLD-MCNC: 11.9 G/DL (ref 11.1–15.9)
IMM GRANULOCYTES # BLD: 0 X10E3/UL (ref 0–0.1)
IMM GRANULOCYTES NFR BLD: 0 %
LYMPHOCYTES # BLD AUTO: 1.7 X10E3/UL (ref 0.7–3.1)
LYMPHOCYTES NFR BLD AUTO: 31 %
MCH RBC QN AUTO: 28 PG (ref 26.6–33)
MCHC RBC AUTO-ENTMCNC: 32.4 G/DL (ref 31.5–35.7)
MCV RBC AUTO: 86 FL (ref 79–97)
MONOCYTES # BLD AUTO: 0.6 X10E3/UL (ref 0.1–0.9)
MONOCYTES NFR BLD AUTO: 10 %
NEUTROPHILS # BLD AUTO: 3.1 X10E3/UL (ref 1.4–7)
NEUTROPHILS NFR BLD AUTO: 55 %
OPIATES UR QL: NEGATIVE NG/ML
PCP UR QL: NEGATIVE NG/ML
PLATELET # BLD AUTO: 242 X10E3/UL (ref 150–379)
POTASSIUM SERPL-SCNC: 3.6 MMOL/L (ref 3.5–5.2)
PROT SERPL-MCNC: 7.3 G/DL (ref 6–8.5)
RBC # BLD AUTO: 4.25 X10E6/UL (ref 3.77–5.28)
SODIUM SERPL-SCNC: 139 MMOL/L (ref 134–144)
TSH SERPL DL<=0.005 MIU/L-ACNC: 1.51 UIU/ML (ref 0.45–4.5)
WBC # BLD AUTO: 5.5 X10E3/UL (ref 3.4–10.8)